# Patient Record
Sex: MALE | Race: WHITE | NOT HISPANIC OR LATINO
[De-identification: names, ages, dates, MRNs, and addresses within clinical notes are randomized per-mention and may not be internally consistent; named-entity substitution may affect disease eponyms.]

---

## 2019-04-11 ENCOUNTER — TRANSCRIPTION ENCOUNTER (OUTPATIENT)
Age: 59
End: 2019-04-11

## 2020-01-05 ENCOUNTER — TRANSCRIPTION ENCOUNTER (OUTPATIENT)
Age: 60
End: 2020-01-05

## 2020-01-05 PROBLEM — Z00.00 ENCOUNTER FOR PREVENTIVE HEALTH EXAMINATION: Status: ACTIVE | Noted: 2020-01-05

## 2020-01-09 ENCOUNTER — APPOINTMENT (OUTPATIENT)
Dept: ORTHOPEDIC SURGERY | Facility: CLINIC | Age: 60
End: 2020-01-09

## 2020-09-03 ENCOUNTER — TRANSCRIPTION ENCOUNTER (OUTPATIENT)
Age: 60
End: 2020-09-03

## 2021-04-15 ENCOUNTER — TRANSCRIPTION ENCOUNTER (OUTPATIENT)
Age: 61
End: 2021-04-15

## 2021-04-21 ENCOUNTER — TRANSCRIPTION ENCOUNTER (OUTPATIENT)
Age: 61
End: 2021-04-21

## 2021-08-25 ENCOUNTER — TRANSCRIPTION ENCOUNTER (OUTPATIENT)
Age: 61
End: 2021-08-25

## 2021-12-17 ENCOUNTER — TRANSCRIPTION ENCOUNTER (OUTPATIENT)
Age: 61
End: 2021-12-17

## 2023-01-13 ENCOUNTER — APPOINTMENT (OUTPATIENT)
Dept: UROLOGY | Facility: CLINIC | Age: 63
End: 2023-01-13
Payer: MEDICAID

## 2023-01-13 VITALS
SYSTOLIC BLOOD PRESSURE: 106 MMHG | TEMPERATURE: 97.9 F | BODY MASS INDEX: 25.77 KG/M2 | WEIGHT: 180 LBS | HEART RATE: 55 BPM | OXYGEN SATURATION: 97 % | DIASTOLIC BLOOD PRESSURE: 72 MMHG | HEIGHT: 70 IN

## 2023-01-13 DIAGNOSIS — R31.0 GROSS HEMATURIA: ICD-10-CM

## 2023-01-13 PROCEDURE — 99204 OFFICE O/P NEW MOD 45 MIN: CPT

## 2023-01-13 NOTE — ASSESSMENT
[FreeTextEntry1] : Assessment:   \par VIVIANA MAN is a 62 year old male with gross hematuria and lower urinary tract symptoms.\par \par The patient and I discussed the finding of hematuria. We discussed the fact that the differential diagnosis can include both benign, inflammatory and malignant etiologies such as prostatic bleeding in males, urolithiasis, malignancy, infection, medical kidney disease, hemoglobinuria and idiopathic etiologies. We discussed the fact that the workup dictates evaluation for both upper tract and lower urinary tract sources for bleeding. Upper tract evaluation includes imaging studies to assess renal and ureteral anatomy and the lower tract evaluation requires cystoscopy for direct visualization of the bladder and urethra. We will have the patient return for upper tract imaging and cystoscopy.  \par \par We also discussed the natural history of enlarged prostate/lower urinary tract symptoms as well as potential treatments.  This includes medication such as alpha blockers and 5 alpha reductase inhibitors, and surgical treatments ranging from TURP to prostatic lift to water vapor therapy.\par \par Plan: \par -UA, UCx, urine cytology \par -CT urogram \par -Cystoscopy in 2 weeks if negative\par -Patient will consider potential therapy for his LUTS based on his degree of bother\par \par \par Jovi Hernandez MD\par Director, Endourology and [facility]\par [facility]\par  of Urology\par [employer] [facility] [employer] [facility]

## 2023-01-13 NOTE — HISTORY OF PRESENT ILLNESS
[FreeTextEntry1] : Dear Dr. López,\par \par I had the pleasure of seeing your patient, Carolina Man, in the office today. Please see my office note and assessment/plan below\par \par Thank you for allowing me to participate in their care and please do not hesitate to contact me with any questions or concerns regarding their care. \par \par Sincerely, \par \par Jovi Hernandez MD\par Director, Endourology and [facility]\par [facility]\par  of Urology\par [facility] [facility] at [facility] \par \par ------------------------------------------------------------------------------------------------------------------------------------------- \par Name CAROLINA MAN \par MRN [id number] \par  \par ------------------------------------------------------------------------------------------------------------------------------------------- \par Date of First Visit: 2023\par Referring Provider/PCP: self ([facility]) / Kvng López MD\par ------------------------------------------------------------------------------------------------------------------------------------------- \par CC: Gross hematuria \par \par History of Present Illness: CAROLINA MAN is a 62 year old male with PMH of BPH who presents for evaluation of gross hematuria. Last  he experienced about 7 episodes of gross hematuria with mild dysuria.  On  he noticed something small in the toilet that looked like it might have been a kidney stone. On Monday he had two more episodes of gross hematuria.  He repots a negative gross hematuria workup about 7 years ago. He also painfully passed a kidney stone on his own about 7-8 years ago.  He recently saw is PCP for a general physical (prior to gross hematuria episode), and his UA noted trace blood. PSA 1.20 on 2023.  Today he feels well. Denies fever, chills, nausea, vomiting, severe pain, no dysuria, nocturia. \par \par LUTS: Weak urinary flow, straining to initiate urination in the morning and mild urinary urgency. He takes Saw Palmetto and pumpkin seeds.  Does not take other medications for urinary symptoms. \par \par Cigarette smoking: never smoker\par History of kidney stones: Passed one about 7-8 years ago   \par History of UTI or STI: No \par Recent trauma or strenuous activity: No, play soccer 3x/week \par History of chronic indwelling catheters: No \par Family history of urothelial or other genitourinary cancers: \par History of occupational exposures (chemical benzene or aromatic amines): No\par Personal history of cancer:   None \par History of cyclophosphamide/ifosfamide chemotherapy: None \par History of pelvic radiation: None \par \par Pelvic US from 2022 (report only). Impression: Post void residual of 64cc. Enlarged prostate (75cc)\par

## 2023-01-15 ENCOUNTER — TRANSCRIPTION ENCOUNTER (OUTPATIENT)
Age: 63
End: 2023-01-15

## 2023-01-17 ENCOUNTER — NON-APPOINTMENT (OUTPATIENT)
Age: 63
End: 2023-01-17

## 2023-01-17 LAB
APPEARANCE: CLEAR
BACTERIA UR CULT: NORMAL
BACTERIA: NEGATIVE
BILIRUBIN URINE: NEGATIVE
BLOOD URINE: ABNORMAL
COLOR: COLORLESS
GLUCOSE QUALITATIVE U: NEGATIVE
HYALINE CASTS: 1 /LPF
KETONES URINE: NEGATIVE
LEUKOCYTE ESTERASE URINE: NEGATIVE
MICROSCOPIC-UA: NORMAL
NITRITE URINE: NEGATIVE
PH URINE: 6
PROTEIN URINE: NEGATIVE
RED BLOOD CELLS URINE: 3 /HPF
SPECIFIC GRAVITY URINE: 1.01
SQUAMOUS EPITHELIAL CELLS: 0 /HPF
UROBILINOGEN URINE: NORMAL
WHITE BLOOD CELLS URINE: 1 /HPF

## 2023-01-19 LAB — URINE CYTOLOGY: NORMAL

## 2023-01-25 ENCOUNTER — APPOINTMENT (OUTPATIENT)
Dept: CT IMAGING | Facility: HOSPITAL | Age: 63
End: 2023-01-25

## 2023-01-25 ENCOUNTER — OUTPATIENT (OUTPATIENT)
Dept: OUTPATIENT SERVICES | Facility: HOSPITAL | Age: 63
LOS: 1 days | End: 2023-01-25
Payer: MEDICAID

## 2023-01-25 PROCEDURE — 82565 ASSAY OF CREATININE: CPT

## 2023-01-25 PROCEDURE — 74178 CT ABD&PLV WO CNTR FLWD CNTR: CPT | Mod: 26

## 2023-01-25 PROCEDURE — 74178 CT ABD&PLV WO CNTR FLWD CNTR: CPT

## 2023-01-27 ENCOUNTER — APPOINTMENT (OUTPATIENT)
Dept: UROLOGY | Facility: CLINIC | Age: 63
End: 2023-01-27
Payer: MEDICAID

## 2023-01-27 VITALS
SYSTOLIC BLOOD PRESSURE: 109 MMHG | DIASTOLIC BLOOD PRESSURE: 68 MMHG | HEART RATE: 62 BPM | TEMPERATURE: 97.8 F | OXYGEN SATURATION: 96 %

## 2023-01-27 PROCEDURE — 52000 CYSTOURETHROSCOPY: CPT

## 2023-01-27 PROCEDURE — 99213 OFFICE O/P EST LOW 20 MIN: CPT | Mod: 25

## 2023-01-27 NOTE — HISTORY OF PRESENT ILLNESS
[FreeTextEntry1] : ------------------------------------------------------------------------------------------------------------------------------------------- \par Name VIVIANA MAN \par MRN [id number] \par  \par ------------------------------------------------------------------------------------------------------------------------------------------- \par Date of First Visit: 2023\par Referring Provider/PCP: self ([facility]) / Kvng López MD\par ------------------------------------------------------------------------------------------------------------------------------------------- \par CC: Gross hematuria \par \par History of Present Illness: VIVIANA MAN is a 62 year old male with PMH of BPH who presents for evaluation of gross hematuria. Last  he experienced about 7 episodes of gross hematuria with mild dysuria. On  he noticed something small in the toilet that looked like it might have been a kidney stone. On Monday he had two more episodes of gross hematuria. He repots a negative gross hematuria workup about 7 years ago. He also painfully passed a kidney stone on his own about 7-8 years ago. He recently saw is PCP for a general physical (prior to gross hematuria episode), and his UA noted trace blood. PSA 1.20 on 2023. Today he feels well. Denies fever, chills, nausea, vomiting, severe pain, no dysuria, nocturia. \par \par LUTS: Weak urinary flow, straining to initiate urination in the morning and mild urinary urgency. He takes Saw Palmetto and pumpkin seeds. Does not take other medications for urinary symptoms. \par \par Cigarette smoking: never smoker\par History of kidney stones: Passed one about 7-8 years ago \par History of UTI or STI: No \par Recent trauma or strenuous activity: No, play soccer 3x/week \par History of chronic indwelling catheters: No \par Family history of urothelial or other genitourinary cancers: \par History of occupational exposures (chemical benzene or aromatic amines): No\par Personal history of cancer: None \par History of cyclophosphamide/ifosfamide chemotherapy: None \par History of pelvic radiation: None \par \par Pelvic US from 2022 (report only). Impression: Post void residual of 64cc. Enlarged prostate (75cc)\par \par ------------------------------------------------------------------------------------------------------------------------------------------- \par Interval History (2023):  Patient presents for cysto for evaluation for gross hematuria. \par Cytology negative, UCx negative, UA 3 RBC's\par \par CTU from 2023 [facility] in [facility]. IMPRESSION: 0.8 cm cortical perfusional abnormality at the posterior upper pole of the right kidney. An enhancing solid lesion cannot be excluded. Further evaluation with MRI is recommended. Enlarged prostate. Correlate with PSA level. No hydronephrosis. No renal or ureteral stones. No urothelial lesions.\par \par Procedure: Cysto in the office today (see procedure note for details) - trilobar hypertrophy with IPP and friable prostatic urothelium\par

## 2023-01-27 NOTE — ASSESSMENT
[FreeTextEntry1] : Assessment: \par   \par Mr. VIVIANA MAN is a 62 year male with intermittent gross hematuria and mildly bothersome BPH/LUTS, primarily obstructive. Also with intermittent GH s/p negative workup, presumably prostatic bleeding. \par   \par We discussed the following options for managing the patient's lower urinary tract symptoms (LUTS) due to BPH: \par   \par (1) Behavioral modification: timed and double voiding, reduced oral fluid intake at night, avoid bladder irritants (caffeine, alcohol, smoking, spicy foods) \par (2) Medical therapy: medication classes include alpha-blockers, 5-alpha reductase inhibitors, anticholinergics, PDE-5 inhibitors (Tadalafil is approved for co-treatment of BPH and erectile dysfunction), and combination therapy \par (3) Surgical intervention: TURP (transurethral resection of the prostate), UroLIFT (prostatic urethral lift), Rezum (water vapor ablation of prostate), PVP (photovaporization of prostate), enucleation of the prostate, and simple prostatectomy \par   \par We also discussed the indications, mechanism of action, and potential side effects of potential medications, as well as the details, risks, and benefits of the relevant surgical procedures. Based on his risk factors, clinical condition, and personal preferences, the most applicable and appropriate option would be ***. \par   \par   \par Plan: \par -Follow up 6 months or sooner as needed\par -Behavioral modifications\par

## 2023-07-12 ENCOUNTER — APPOINTMENT (OUTPATIENT)
Dept: UROLOGY | Facility: CLINIC | Age: 63
End: 2023-07-12

## 2023-11-13 ENCOUNTER — NON-APPOINTMENT (OUTPATIENT)
Age: 63
End: 2023-11-13

## 2023-11-15 ENCOUNTER — APPOINTMENT (OUTPATIENT)
Dept: UROLOGY | Facility: CLINIC | Age: 63
End: 2023-11-15
Payer: MEDICAID

## 2023-11-15 VITALS
HEART RATE: 63 BPM | BODY MASS INDEX: 25.83 KG/M2 | SYSTOLIC BLOOD PRESSURE: 116 MMHG | TEMPERATURE: 98.2 F | WEIGHT: 180 LBS | DIASTOLIC BLOOD PRESSURE: 75 MMHG

## 2023-11-15 PROCEDURE — 99215 OFFICE O/P EST HI 40 MIN: CPT

## 2023-12-15 ENCOUNTER — APPOINTMENT (OUTPATIENT)
Dept: UROLOGY | Facility: CLINIC | Age: 63
End: 2023-12-15
Payer: MEDICAID

## 2023-12-15 VITALS
SYSTOLIC BLOOD PRESSURE: 120 MMHG | HEART RATE: 70 BPM | DIASTOLIC BLOOD PRESSURE: 81 MMHG | OXYGEN SATURATION: 98 % | BODY MASS INDEX: 25.83 KG/M2 | WEIGHT: 180 LBS | TEMPERATURE: 98.2 F

## 2023-12-15 PROCEDURE — 99214 OFFICE O/P EST MOD 30 MIN: CPT

## 2023-12-15 NOTE — HISTORY OF PRESENT ILLNESS
[FreeTextEntry1] : Name VIVIANA MAN MRN [id number]  1960 ------------------------------------------------------------------------------------------------------------------------------------------- Date of First Visit: 2023 Referring Provider/PCP: self ([facility]) / Kvng Lpóez MD ------------------------------------------------------------------------------------------------------------------------------------------- CC: Gross hematuria  History of Present Illness: VIVIANA MAN is a 62 year old male with PMH of BPH who presents for evaluation of gross hematuria. Last  he experienced about 7 episodes of gross hematuria with mild dysuria. On  he noticed something small in the toilet that looked like it might have been a kidney stone. On Monday he had two more episodes of gross hematuria. He repots a negative gross hematuria workup about 7 years ago. He also painfully passed a kidney stone on his own about 7-8 years ago. He recently saw is PCP for a general physical (prior to gross hematuria episode), and his UA noted trace blood. PSA 1.20 on 2023. Today he feels well. Denies fever, chills, nausea, vomiting, severe pain, no dysuria, nocturia.  LUTS: Weak urinary flow, straining to initiate urination in the morning and mild urinary urgency. He takes Saw Palmetto and pumpkin seeds. Does not take other medications for urinary symptoms.  Cigarette smoking: never smoker History of kidney stones: Passed one about 7-8 years ago History of UTI or STI: No Recent trauma or strenuous activity: No, play soccer 3x/week History of chronic indwelling catheters: No Family history of urothelial or other genitourinary cancers: History of occupational exposures (chemical benzene or aromatic amines): No Personal history of cancer: None History of cyclophosphamide/ifosfamide chemotherapy: None History of pelvic radiation: None  Pelvic US from 2022 (report only). Impression: Post void residual of 64cc. Enlarged prostate (75cc) ------------------------------------------------------------------------------------------------------------------------------------------- Interval History (2023): Patient presents for cysto for evaluation for gross hematuria. Cytology negative, UCx negative, UA 3 RBC's  CTU from 2023 can be found in [facility]. IMPRESSION: 0.8 cm cortical perfusional abnormality at the posterior upper pole of the right kidney. An enhancing solid lesion cannot be excluded. Further evaluation with MRI is recommended. Enlarged prostate. Correlate with PSA level. No hydronephrosis. No renal or ureteral stones. No urothelial lesions.  Procedure: Cysto in the office today (see procedure note for details) - trilobar hypertrophy with IPP and friable prostatic urothelium ------------------------------------------------------------------------------------------------------------------------------------------- Interval History (11/15/2023): Experiencing intermittent gross hematuria for the past 3 weeks. Does not occur every day but it usually happens in the mornings. No other symptoms. No fever, pain, other urinary symptoms. ------------------------------------------------------------------------------------------------------------------------------------------- Interval History (12/15/2023): Has beenn on one month trial of alfuzosin 10mg. Hematuria resolved 2 days after starting the medication without any recurrent episodes since then. Also notes improved stream. Denies retrograde ejaculation or other adverse treatment effects.

## 2023-12-15 NOTE — ASSESSMENT
[FreeTextEntry1] : Assessment: Mr. VIVIANA MAN is a 63M with intermittent gross hematuria and mildly bothersome BPH/LUTS, primarily obstructive. Previous gross hematuria negative, presumably prostatic bleeding, all of which has now resolved on alpha-blocker with improvement in LUTS. He is keen to avoid taking medicines if possible.   Plan: -Continue alfuzosin 10mg - patient will take for a 3-month trial and then stop to see if sx remains stable/no hematuria -Follow up 6 months

## 2023-12-26 ENCOUNTER — APPOINTMENT (OUTPATIENT)
Dept: UROLOGY | Facility: CLINIC | Age: 63
End: 2023-12-26

## 2024-01-17 ENCOUNTER — APPOINTMENT (OUTPATIENT)
Dept: ORTHOPEDIC SURGERY | Facility: CLINIC | Age: 64
End: 2024-01-17
Payer: MEDICAID

## 2024-01-17 VITALS
DIASTOLIC BLOOD PRESSURE: 79 MMHG | BODY MASS INDEX: 25.2 KG/M2 | HEIGHT: 70 IN | HEART RATE: 69 BPM | SYSTOLIC BLOOD PRESSURE: 118 MMHG | WEIGHT: 176 LBS | OXYGEN SATURATION: 97 %

## 2024-01-17 DIAGNOSIS — Z78.9 OTHER SPECIFIED HEALTH STATUS: ICD-10-CM

## 2024-01-17 DIAGNOSIS — M17.10 UNILATERAL PRIMARY OSTEOARTHRITIS, UNSPECIFIED KNEE: ICD-10-CM

## 2024-01-17 DIAGNOSIS — M17.30 UNILATERAL POST-TRAUMATIC OSTEOARTHRITIS, UNSPECIFIED KNEE: ICD-10-CM

## 2024-01-17 PROCEDURE — 99214 OFFICE O/P EST MOD 30 MIN: CPT | Mod: 25

## 2024-01-17 PROCEDURE — 73564 X-RAY EXAM KNEE 4 OR MORE: CPT | Mod: 50

## 2024-01-17 NOTE — PHYSICAL EXAM
[de-identified] : Right knee Full extension positive crepitance and medial and lateral joint tenderness plus grade 2 lachman and grade 1 Quad active test.  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  Left and right knee MRI's done recently were personally reviewed by me today, done at [facility] and show tricompartmental DJD with absent medial menisci and both previous ACL constructs torn.

## 2024-01-17 NOTE — HISTORY OF PRESENT ILLNESS
[de-identified] : CAROLINA MAN is a 63 year  old  M patient that presents today with bilateral knee pain. Carolina is a former  and has had bilateral ACL,& MCL Reconstructions  in the past but has felt instability after both and now has developed siginificant post traumatic arhtritis is both knees.  He comes to discuss the treatment options in 2024 and what new science may be available.  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

## 2024-01-17 NOTE — DISCUSSION/SUMMARY
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  We discussed cortisone and HA and oral anti-inflammatories and he has had benefit from Aleve.  We had a long discussion today about the risks and benefits of various Orthobiologic injection procedures. These included PRP, Amniotic Allograft product, Lipogems/lipoaspirate injections and BMAC. The fact that these treatments are investigational and not approved by any insurance product was also discussed and we discussed the RCT's of lipogems and ASA, however he deosn;t want to take those chances of placebo yet.  Plan  After 40 minute discussion he has decided to stay with his PRE program and playing as able till he cannot anymore and then consider TKA.  He will return for PRP perhaps once he can afford it.

## 2024-05-06 RX ORDER — ALFUZOSIN HYDROCHLORIDE 10 MG/1
10 TABLET, EXTENDED RELEASE ORAL
Qty: 30 | Refills: 5 | Status: ACTIVE | COMMUNITY
Start: 2023-11-15 | End: 1900-01-01

## 2024-06-01 ENCOUNTER — NON-APPOINTMENT (OUTPATIENT)
Age: 64
End: 2024-06-01

## 2024-06-07 ENCOUNTER — APPOINTMENT (OUTPATIENT)
Dept: UROLOGY | Facility: CLINIC | Age: 64
End: 2024-06-07

## 2024-06-13 ENCOUNTER — NON-APPOINTMENT (OUTPATIENT)
Age: 64
End: 2024-06-13

## 2024-12-26 ENCOUNTER — APPOINTMENT (OUTPATIENT)
Dept: UROLOGY | Facility: CLINIC | Age: 64
End: 2024-12-26
Payer: MEDICAID

## 2024-12-26 VITALS
OXYGEN SATURATION: 97 % | HEIGHT: 70 IN | SYSTOLIC BLOOD PRESSURE: 107 MMHG | WEIGHT: 178 LBS | BODY MASS INDEX: 25.48 KG/M2 | HEART RATE: 65 BPM | DIASTOLIC BLOOD PRESSURE: 69 MMHG | TEMPERATURE: 98.7 F

## 2024-12-26 DIAGNOSIS — N40.1 BENIGN PROSTATIC HYPERPLASIA WITH LOWER URINARY TRACT SYMPMS: ICD-10-CM

## 2024-12-26 DIAGNOSIS — R35.0 BENIGN PROSTATIC HYPERPLASIA WITH LOWER URINARY TRACT SYMPMS: ICD-10-CM

## 2024-12-26 PROCEDURE — 99214 OFFICE O/P EST MOD 30 MIN: CPT | Mod: 25

## 2024-12-26 PROCEDURE — 51798 US URINE CAPACITY MEASURE: CPT

## 2024-12-26 RX ORDER — TADALAFIL 5 MG/1
5 TABLET ORAL
Qty: 30 | Refills: 5 | Status: ACTIVE | COMMUNITY
Start: 2024-12-26 | End: 1900-01-01

## 2025-04-25 ENCOUNTER — APPOINTMENT (OUTPATIENT)
Dept: UROLOGY | Facility: CLINIC | Age: 65
End: 2025-04-25
Payer: COMMERCIAL

## 2025-04-25 VITALS
OXYGEN SATURATION: 97 % | HEART RATE: 70 BPM | TEMPERATURE: 97.5 F | SYSTOLIC BLOOD PRESSURE: 120 MMHG | HEIGHT: 70 IN | DIASTOLIC BLOOD PRESSURE: 62 MMHG | BODY MASS INDEX: 25.77 KG/M2 | WEIGHT: 180 LBS

## 2025-04-25 DIAGNOSIS — N40.1 BENIGN PROSTATIC HYPERPLASIA WITH LOWER URINARY TRACT SYMPMS: ICD-10-CM

## 2025-04-25 DIAGNOSIS — R31.0 GROSS HEMATURIA: ICD-10-CM

## 2025-04-25 DIAGNOSIS — R35.0 BENIGN PROSTATIC HYPERPLASIA WITH LOWER URINARY TRACT SYMPMS: ICD-10-CM

## 2025-04-25 PROCEDURE — G2211 COMPLEX E/M VISIT ADD ON: CPT | Mod: NC

## 2025-04-25 PROCEDURE — 99214 OFFICE O/P EST MOD 30 MIN: CPT

## 2025-05-01 LAB
APPEARANCE: CLEAR
BACTERIA UR CULT: NORMAL
BACTERIA: NEGATIVE /HPF
BILIRUBIN URINE: NEGATIVE
BLOOD URINE: ABNORMAL
CALCIUM OXALATE CRYSTALS: PRESENT
CAST: 0 /LPF
COLOR: YELLOW
EPITHELIAL CELLS: 0 /HPF
GLUCOSE QUALITATIVE U: NEGATIVE MG/DL
KETONES URINE: NEGATIVE MG/DL
LEUKOCYTE ESTERASE URINE: NEGATIVE
MICROSCOPIC-UA: NORMAL
NITRITE URINE: NEGATIVE
PH URINE: 5.5
PROTEIN URINE: 30 MG/DL
RED BLOOD CELLS URINE: 13 /HPF
REVIEW: NORMAL
SPECIFIC GRAVITY URINE: 1.02
UROBILINOGEN URINE: 0.2 MG/DL
WHITE BLOOD CELLS URINE: 0 /HPF

## 2025-06-19 ENCOUNTER — APPOINTMENT (OUTPATIENT)
Dept: UROLOGY | Facility: CLINIC | Age: 65
End: 2025-06-19
Payer: MEDICAID

## 2025-06-19 VITALS
HEIGHT: 70 IN | WEIGHT: 180 LBS | DIASTOLIC BLOOD PRESSURE: 73 MMHG | OXYGEN SATURATION: 96 % | TEMPERATURE: 98.2 F | SYSTOLIC BLOOD PRESSURE: 110 MMHG | BODY MASS INDEX: 25.77 KG/M2 | HEART RATE: 69 BPM

## 2025-06-19 PROCEDURE — 99215 OFFICE O/P EST HI 40 MIN: CPT

## 2025-06-20 ENCOUNTER — TRANSCRIPTION ENCOUNTER (OUTPATIENT)
Age: 65
End: 2025-06-20

## 2025-06-20 LAB
ANION GAP SERPL CALC-SCNC: 17 MMOL/L
BUN SERPL-MCNC: 25 MG/DL
CALCIUM SERPL-MCNC: 9.4 MG/DL
CHLORIDE SERPL-SCNC: 109 MMOL/L
CO2 SERPL-SCNC: 19 MMOL/L
CREAT SERPL-MCNC: 1.04 MG/DL
EGFRCR SERPLBLD CKD-EPI 2021: 81 ML/MIN/1.73M2
GLUCOSE SERPL-MCNC: 79 MG/DL
POTASSIUM SERPL-SCNC: 4.7 MMOL/L
PSA FREE FLD-MCNC: 31 %
PSA FREE SERPL-MCNC: 0.32 NG/ML
PSA SERPL-MCNC: 1.02 NG/ML
SODIUM SERPL-SCNC: 145 MMOL/L

## 2025-06-24 ENCOUNTER — NON-APPOINTMENT (OUTPATIENT)
Age: 65
End: 2025-06-24

## 2025-07-21 DIAGNOSIS — N28.9 DISORDER OF KIDNEY AND URETER, UNSPECIFIED: ICD-10-CM

## 2025-09-16 ENCOUNTER — NON-APPOINTMENT (OUTPATIENT)
Age: 65
End: 2025-09-16